# Patient Record
Sex: MALE | Race: OTHER | Employment: UNEMPLOYED | URBAN - METROPOLITAN AREA
[De-identification: names, ages, dates, MRNs, and addresses within clinical notes are randomized per-mention and may not be internally consistent; named-entity substitution may affect disease eponyms.]

---

## 2017-05-12 RX ORDER — HYDROCORTISONE ACETATE 25 MG/1
25 SUPPOSITORY RECTAL 2 TIMES DAILY PRN
Qty: 20 SUPPOSITORY | Refills: 0 | Status: SHIPPED | OUTPATIENT
Start: 2017-05-12 | End: 2017-05-24 | Stop reason: SDUPTHER

## 2017-05-24 RX ORDER — HYDROCORTISONE ACETATE 25 MG/1
25 SUPPOSITORY RECTAL 2 TIMES DAILY PRN
Qty: 20 SUPPOSITORY | Refills: 0 | Status: SHIPPED | OUTPATIENT
Start: 2017-05-24 | End: 2017-06-02 | Stop reason: SDUPTHER

## 2017-06-02 ENCOUNTER — OFFICE VISIT (OUTPATIENT)
Dept: FAMILY MEDICINE CLINIC | Age: 42
End: 2017-06-02
Payer: MEDICARE

## 2017-06-02 ENCOUNTER — TELEPHONE (OUTPATIENT)
Dept: FAMILY MEDICINE CLINIC | Age: 42
End: 2017-06-02

## 2017-06-02 VITALS
BODY MASS INDEX: 22.71 KG/M2 | HEART RATE: 90 BPM | TEMPERATURE: 97.4 F | WEIGHT: 145 LBS | OXYGEN SATURATION: 98 % | DIASTOLIC BLOOD PRESSURE: 71 MMHG | SYSTOLIC BLOOD PRESSURE: 116 MMHG

## 2017-06-02 DIAGNOSIS — Z00.00 HEALTHCARE MAINTENANCE: ICD-10-CM

## 2017-06-02 DIAGNOSIS — K64.8 OTHER HEMORRHOIDS: Primary | ICD-10-CM

## 2017-06-02 DIAGNOSIS — Z11.4 SCREENING FOR HIV (HUMAN IMMUNODEFICIENCY VIRUS): ICD-10-CM

## 2017-06-02 PROCEDURE — 99213 OFFICE O/P EST LOW 20 MIN: CPT | Performed by: NURSE PRACTITIONER

## 2017-06-02 RX ORDER — HYOSCYAMINE SULFATE 0.125 MG
0.12 TABLET ORAL
COMMUNITY
Start: 2017-05-29 | End: 2017-06-02 | Stop reason: ALTCHOICE

## 2017-06-02 RX ORDER — HYDROCORTISONE ACETATE 25 MG/1
25 SUPPOSITORY RECTAL 2 TIMES DAILY PRN
Qty: 20 SUPPOSITORY | Refills: 0 | Status: SHIPPED | OUTPATIENT
Start: 2017-06-02

## 2017-06-02 RX ORDER — LIDOCAINE HYDROCHLORIDE AND HYDROCORTISONE ACETATE 30; 5 MG/G; MG/G
1 CREAM RECTAL 2 TIMES DAILY
Qty: 1 KIT | Refills: 11 | Status: SHIPPED | OUTPATIENT
Start: 2017-06-02

## 2017-06-02 ASSESSMENT — ENCOUNTER SYMPTOMS
COUGH: 0
SHORTNESS OF BREATH: 0
CONSTIPATION: 0
CHEST TIGHTNESS: 0
NAUSEA: 0
DIARRHEA: 0
RECTAL PAIN: 1
BLOOD IN STOOL: 1
ABDOMINAL PAIN: 0

## 2017-06-02 ASSESSMENT — PATIENT HEALTH QUESTIONNAIRE - PHQ9
SUM OF ALL RESPONSES TO PHQ QUESTIONS 1-9: 0
2. FEELING DOWN, DEPRESSED OR HOPELESS: 0
1. LITTLE INTEREST OR PLEASURE IN DOING THINGS: 0
SUM OF ALL RESPONSES TO PHQ9 QUESTIONS 1 & 2: 0

## 2023-02-13 ENCOUNTER — HOSPITAL ENCOUNTER (OUTPATIENT)
Age: 48
Discharge: HOME | End: 2023-02-13
Payer: MEDICAID

## 2023-02-13 LAB
25(OH)D3 SERPL-MCNC: 12.3 NG/ML
ABSOLUTE EOS #: 0.25 K/UL (ref 0–0.44)
ABSOLUTE IMMATURE GRANULOCYTE: 0.04 K/UL (ref 0–0.3)
ABSOLUTE LYMPH #: 1.83 K/UL (ref 1.1–3.7)
ABSOLUTE MONO #: 0.74 K/UL (ref 0.1–1.2)
ALBUMIN SERPL-MCNC: 4.3 G/DL (ref 3.5–5.2)
ALBUMIN/GLOBULIN RATIO: 1.6 (ref 1–2.5)
ALP SERPL-CCNC: 71 U/L (ref 40–129)
ALT SERPL-CCNC: 28 U/L (ref 5–41)
ANION GAP SERPL CALCULATED.3IONS-SCNC: 14 MMOL/L (ref 9–17)
AST SERPL-CCNC: 23 U/L
BASOPHILS # BLD: 1 % (ref 0–2)
BASOPHILS ABSOLUTE: 0.04 K/UL (ref 0–0.2)
BILIRUB SERPL-MCNC: 0.3 MG/DL (ref 0.3–1.2)
BUN SERPL-MCNC: 13 MG/DL (ref 6–20)
CALCIUM SERPL-MCNC: 9 MG/DL (ref 8.6–10.4)
CHLORIDE SERPL-SCNC: 104 MMOL/L (ref 98–107)
CHOLEST SERPL-MCNC: 167 MG/DL
CHOLESTEROL/HDL RATIO: 2.5
CO2 SERPL-SCNC: 20 MMOL/L (ref 20–31)
CREAT SERPL-MCNC: 0.83 MG/DL (ref 0.7–1.2)
EOSINOPHILS RELATIVE PERCENT: 3 % (ref 1–4)
GFR SERPL CREATININE-BSD FRML MDRD: >60 ML/MIN/1.73M2
GLUCOSE SERPL-MCNC: 101 MG/DL (ref 70–99)
HCT VFR BLD AUTO: 41.4 % (ref 40.7–50.3)
HDLC SERPL-MCNC: 66 MG/DL
HGB BLD-MCNC: 14.3 G/DL (ref 13–17)
IMMATURE GRANULOCYTES: 1 %
LDLC SERPL CALC-MCNC: 82 MG/DL (ref 0–130)
LYMPHOCYTES # BLD: 24 % (ref 24–43)
MCH RBC QN AUTO: 31.3 PG (ref 25.2–33.5)
MCHC RBC AUTO-ENTMCNC: 34.5 G/DL (ref 28.4–34.8)
MCV RBC AUTO: 90.6 FL (ref 82.6–102.9)
MONOCYTES # BLD: 10 % (ref 3–12)
MYOGLOBIN SERPL-MCNC: 22 NG/ML (ref 28–72)
NRBC AUTOMATED: 0 PER 100 WBC
PDW BLD-RTO: 12.1 % (ref 11.8–14.4)
PLATELET # BLD AUTO: 219 K/UL (ref 138–453)
PMV BLD AUTO: 11.5 FL (ref 8.1–13.5)
POTASSIUM SERPL-SCNC: 4.1 MMOL/L (ref 3.7–5.3)
PROT SERPL-MCNC: 7 G/DL (ref 6.4–8.3)
RBC # BLD: 4.57 M/UL (ref 4.21–5.77)
SEG NEUTROPHILS: 61 % (ref 36–65)
SEGMENTED NEUTROPHILS ABSOLUTE COUNT: 4.75 K/UL (ref 1.5–8.1)
SODIUM SERPL-SCNC: 138 MMOL/L (ref 135–144)
TRIGL SERPL-MCNC: 94 MG/DL
TROPONIN I SERPL DL<=0.01 NG/ML-MCNC: <6 NG/L (ref 0–22)
TSH SERPL-ACNC: 0.98 UIU/ML (ref 0.3–5)
WBC # BLD AUTO: 7.7 K/UL (ref 3.5–11.3)

## 2023-02-13 PROCEDURE — 85025 COMPLETE CBC W/AUTO DIFF WBC: CPT

## 2023-02-13 PROCEDURE — 93005 ELECTROCARDIOGRAM TRACING: CPT | Performed by: FAMILY MEDICINE

## 2023-02-13 PROCEDURE — 80053 COMPREHEN METABOLIC PANEL: CPT

## 2023-02-13 PROCEDURE — 82306 VITAMIN D 25 HYDROXY: CPT

## 2023-02-13 PROCEDURE — 83874 ASSAY OF MYOGLOBIN: CPT

## 2023-02-13 PROCEDURE — 80061 LIPID PANEL: CPT

## 2023-02-13 PROCEDURE — 84443 ASSAY THYROID STIM HORMONE: CPT

## 2023-02-13 PROCEDURE — 84484 ASSAY OF TROPONIN QUANT: CPT

## 2023-02-13 PROCEDURE — 36415 COLL VENOUS BLD VENIPUNCTURE: CPT

## 2023-02-14 LAB
EKG ATRIAL RATE: 54 BPM
EKG P AXIS: 57 DEGREES
EKG P-R INTERVAL: 110 MS
EKG Q-T INTERVAL: 420 MS
EKG QRS DURATION: 100 MS
EKG QTC CALCULATION (BAZETT): 398 MS
EKG R AXIS: 83 DEGREES
EKG T AXIS: 66 DEGREES
EKG VENTRICULAR RATE: 54 BPM

## 2023-02-14 PROCEDURE — 93010 ELECTROCARDIOGRAM REPORT: CPT | Performed by: INTERNAL MEDICINE

## 2023-02-16 LAB
MISCELLANEOUS LAB TEST RESULT: NORMAL
TEST NAME: NORMAL

## 2023-02-22 ENCOUNTER — HOSPITAL ENCOUNTER (OUTPATIENT)
Dept: NON INVASIVE DIAGNOSTICS | Age: 48
Discharge: HOME | End: 2023-02-22
Payer: MEDICAID

## 2023-02-22 ENCOUNTER — HOSPITAL ENCOUNTER (OUTPATIENT)
Dept: NUCLEAR MEDICINE | Age: 48
Discharge: HOME | End: 2023-02-24
Payer: MEDICAID

## 2023-02-22 VITALS — BODY MASS INDEX: 23.39 KG/M2 | WEIGHT: 149 LBS | HEIGHT: 67 IN

## 2023-02-22 DIAGNOSIS — R07.9 CHEST PAIN, UNSPECIFIED TYPE: ICD-10-CM

## 2023-02-22 DIAGNOSIS — Z82.79 FAMILY HISTORY OF BICUSPID AORTIC VALVE: ICD-10-CM

## 2023-02-22 LAB
LV EF: 50 %
LV EF: 59 %
LVEF MODALITY: NORMAL
LVEF MODALITY: NORMAL

## 2023-02-22 PROCEDURE — 2580000003 HC RX 258: Performed by: INTERNAL MEDICINE

## 2023-02-22 PROCEDURE — 93306 TTE W/DOPPLER COMPLETE: CPT

## 2023-02-22 PROCEDURE — A9500 TC99M SESTAMIBI: HCPCS | Performed by: INTERNAL MEDICINE

## 2023-02-22 PROCEDURE — 6360000002 HC RX W HCPCS: Performed by: INTERNAL MEDICINE

## 2023-02-22 PROCEDURE — 78452 HT MUSCLE IMAGE SPECT MULT: CPT

## 2023-02-22 PROCEDURE — 3430000000 HC RX DIAGNOSTIC RADIOPHARMACEUTICAL: Performed by: INTERNAL MEDICINE

## 2023-02-22 PROCEDURE — 93017 CV STRESS TEST TRACING ONLY: CPT

## 2023-02-22 RX ORDER — ALBUTEROL SULFATE 90 UG/1
2 AEROSOL, METERED RESPIRATORY (INHALATION) PRN
Status: ACTIVE | OUTPATIENT
Start: 2023-02-22 | End: 2023-02-22

## 2023-02-22 RX ORDER — METOPROLOL TARTRATE 5 MG/5ML
5 INJECTION INTRAVENOUS EVERY 5 MIN PRN
Status: ACTIVE | OUTPATIENT
Start: 2023-02-22 | End: 2023-02-22

## 2023-02-22 RX ORDER — SODIUM CHLORIDE 0.9 % (FLUSH) 0.9 %
5-40 SYRINGE (ML) INJECTION PRN
Status: ACTIVE | OUTPATIENT
Start: 2023-02-22 | End: 2023-02-22

## 2023-02-22 RX ORDER — NITROGLYCERIN 0.4 MG/1
0.4 TABLET SUBLINGUAL EVERY 5 MIN PRN
Status: ACTIVE | OUTPATIENT
Start: 2023-02-22 | End: 2023-02-22

## 2023-02-22 RX ORDER — ATROPINE SULFATE 0.1 MG/ML
0.5 INJECTION INTRAVENOUS EVERY 5 MIN PRN
Status: ACTIVE | OUTPATIENT
Start: 2023-02-22 | End: 2023-02-22

## 2023-02-22 RX ORDER — SODIUM CHLORIDE 0.9 % (FLUSH) 0.9 %
10 SYRINGE (ML) INJECTION PRN
Status: DISCONTINUED | OUTPATIENT
Start: 2023-02-22 | End: 2023-02-25 | Stop reason: HOSPADM

## 2023-02-22 RX ORDER — SODIUM CHLORIDE 9 MG/ML
500 INJECTION, SOLUTION INTRAVENOUS CONTINUOUS PRN
Status: ACTIVE | OUTPATIENT
Start: 2023-02-22 | End: 2023-02-22

## 2023-02-22 RX ORDER — TECHNETIUM TC-99M SESTAMIBI 1 MG/10ML
31.6 INJECTION INTRAVENOUS
Status: COMPLETED | OUTPATIENT
Start: 2023-02-22 | End: 2023-02-22

## 2023-02-22 RX ORDER — TECHNETIUM TC-99M SESTAMIBI 1 MG/10ML
11.5 INJECTION INTRAVENOUS
Status: COMPLETED | OUTPATIENT
Start: 2023-02-22 | End: 2023-02-22

## 2023-02-22 RX ORDER — AMINOPHYLLINE DIHYDRATE 25 MG/ML
50 INJECTION, SOLUTION INTRAVENOUS PRN
Status: ACTIVE | OUTPATIENT
Start: 2023-02-22 | End: 2023-02-22

## 2023-02-22 RX ADMIN — Medication 11.5 MILLICURIE: at 07:24

## 2023-02-22 RX ADMIN — SODIUM CHLORIDE, PRESERVATIVE FREE 10 ML: 5 INJECTION INTRAVENOUS at 07:24

## 2023-02-22 RX ADMIN — REGADENOSON 0.4 MG: 0.08 INJECTION, SOLUTION INTRAVENOUS at 10:22

## 2023-02-22 RX ADMIN — Medication 31.6 MILLICURIE: at 10:10

## 2023-02-22 NOTE — PROCEDURES
[address] [facility]                    [address] [facility]. [address]                              CARDIAC STRESS TEST    PATIENT NAME: Elizabeth Kelley                  :        1975  MED REC NO:   [id number]                              ROOM:  ACCOUNT NO:   [de-identified]                           ADMIT DATE: 2023  PROVIDER:     Anastasia Avila DO    DATE OF STUDY:  2023    TEST TYPE: LEXISCAN CARDIOLYTE STRESS TEST  INDICATION: CHEST PAIN  REFERRING PHYSICIAN: Carmela Doe    RESTING HEART RATE: 86 BEATS PER MINUTE  RESTING BLOOD PRESSURE: 122/60    MEDICATION(S) GIVEN: 0.4MG IV LEXISCAN  REASON FOR TERMINATION: MEDICATION INFUSION COMPLETE    RESTING EKG: ABNORMAL. SINUS SALOME. STRESS HEART RESPONSE: NORMAL  BLOOD PRESSURE RESPONSE: NORMAL  STRESS EKGs: NORMAL  CHEST DISCOMFORT: NO PAIN DURING STRESS  ISCHEMIC EKG CHANGES: NONE    EKG IMPRESSION: ELECTROCARDIOGRAPHICALLY NEGATIVE LEXISCAN STRESS TEST. RADIOISOTOPE RESULTS TO FOLLOW FROM THE DEPARTMENT OF NUCLEAR MEDICINE. COMMENTS: COULDN'T ACHIEVE TARGET HEART RATE - SO CHANGED TO LEXISCAN.     JUAN CARLOS BRISENO DO    D: 2023 15:02:59       T: 2023 15:04:27     /[email or web address]  Job#: [id number]     Doc#: Unknown    CC:    (Retain this field even if not dictated or not decipherable)

## 2023-02-22 NOTE — PROGRESS NOTES
Pt arrived this am for scheduled stress test via treadmill. Pt unable to complete test and target heart rate not achieved. Dr Mac Johnson advised and order amended to [facility].

## 2023-02-22 NOTE — PROGRESS NOTES
Maryanne Garza. Stress Tech performs patient preparation of physical comfort, review test procedures, pre-stress EKG. Lung Sounds clear in all fields. Consent verified. Educated patient on test procedure and possible side effects of Lexiscan. Cardiologist reviewed pre-test EKG and is present for test. Patient tolerated test well with minor SOB and chest pressure which resolved to baseline after test with caffeine. EKG portion of test complete, nuc med portion pending.

## 2024-01-22 ENCOUNTER — NURSE TRIAGE (OUTPATIENT)
Dept: OTHER | Facility: CLINIC | Age: 49
End: 2024-01-22

## 2024-01-22 NOTE — TELEPHONE ENCOUNTER
Location of patient: [place]    Received call from Debi at [facility]; Patient with Red Flag Complaint requesting to establish care with [facility], no provider preference.    Limited triage, pt not with caller.     Subjective: Caller states \"Everyone in his family has diabetes. He is fatigued and thirsty. He feels better when he eats. His family wants to get his sugar tested.\"     Current Symptoms: fatigue    Onset: 1 year ago; intermittent    Associated Symptoms: intermittent lightheaded, no syncope    Pain Severity: pt not present to rate. Has not mentioned pain to caller.     Temperature: denies     What has been tried: eating improves s/s. Has not checked glucose.    LMP: NA Pregnant: NA    Recommended disposition: See PCP within 3 Days If unable to get in with new provider go to urgent care or ED.     Care advice provided, patient verbalizes understanding; denies any other questions or concerns; instructed to call back for any new or worsening symptoms.    Patient/Caller agrees with recommended disposition; writer provided warm transfer to Fanny at [facility] for appointment scheduling    Attention Provider:  Thank you for allowing me to participate in the care of your patient.  The patient was connected to triage in response to information provided to the [facility].  Please do not respond through this encounter as the response is not directed to a shared pool.      Reason for Disposition   [1] Caller is not with the adult (patient) AND [2] reporting urgent symptoms   MILD weakness (i.e., does not interfere with ability to work, go to school, normal activities) and persists > 1 week    Protocols used: Information Only Call - No Triage-ADULT-[facility], Weakness (Generalized) and Fatigue-ADULT-[place]